# Patient Record
Sex: FEMALE | Race: WHITE
[De-identification: names, ages, dates, MRNs, and addresses within clinical notes are randomized per-mention and may not be internally consistent; named-entity substitution may affect disease eponyms.]

---
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
## 2022-05-23 ENCOUNTER — HOSPITAL ENCOUNTER (EMERGENCY)
Dept: HOSPITAL 46 - ED | Age: 22
Discharge: HOME | End: 2022-05-23
Payer: COMMERCIAL

## 2022-05-23 VITALS — HEIGHT: 65 IN | WEIGHT: 192.9 LBS | BODY MASS INDEX: 32.14 KG/M2

## 2022-05-23 DIAGNOSIS — S06.0X0A: Primary | ICD-10-CM
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
## 2022-05-23 DIAGNOSIS — Z88.8: ICD-10-CM

## 2023-07-05 ENCOUNTER — HOSPITAL ENCOUNTER (EMERGENCY)
Dept: HOSPITAL 46 - ED | Age: 23
Discharge: HOME | End: 2023-07-05
Payer: COMMERCIAL

## 2023-07-05 VITALS — SYSTOLIC BLOOD PRESSURE: 113 MMHG | DIASTOLIC BLOOD PRESSURE: 67 MMHG

## 2023-07-05 VITALS — WEIGHT: 177 LBS | BODY MASS INDEX: 29.49 KG/M2 | HEIGHT: 65 IN

## 2023-07-05 DIAGNOSIS — J02.9: Primary | ICD-10-CM

## 2023-07-05 DIAGNOSIS — Z79.899: ICD-10-CM

## 2023-07-05 DIAGNOSIS — Z91.014: ICD-10-CM

## 2023-07-05 PROCEDURE — A9270 NON-COVERED ITEM OR SERVICE: HCPCS
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
## 2024-08-16 ENCOUNTER — HOSPITAL ENCOUNTER (EMERGENCY)
Dept: HOSPITAL 46 - ED | Age: 24
Discharge: HOME | End: 2024-08-16
Payer: COMMERCIAL

## 2024-08-16 VITALS — BODY MASS INDEX: 31.55 KG/M2 | HEIGHT: 65 IN | WEIGHT: 189.38 LBS

## 2024-08-16 VITALS — SYSTOLIC BLOOD PRESSURE: 118 MMHG | DIASTOLIC BLOOD PRESSURE: 75 MMHG

## 2024-08-16 DIAGNOSIS — K64.8: ICD-10-CM

## 2024-08-16 DIAGNOSIS — Z3A.19: ICD-10-CM

## 2024-08-16 DIAGNOSIS — Z91.014: ICD-10-CM

## 2024-08-16 DIAGNOSIS — O99.612: Primary | ICD-10-CM

## 2024-08-16 DIAGNOSIS — Z79.899: ICD-10-CM

## 2025-01-06 ENCOUNTER — HOSPITAL ENCOUNTER (INPATIENT)
Dept: HOSPITAL 46 - FBC | Age: 25
LOS: 1 days | Discharge: HOME | End: 2025-01-07
Attending: OBSTETRICS & GYNECOLOGY | Admitting: OBSTETRICS & GYNECOLOGY
Payer: COMMERCIAL

## 2025-01-06 VITALS — WEIGHT: 223.99 LBS | HEIGHT: 64.02 IN | BODY MASS INDEX: 38.24 KG/M2

## 2025-01-06 VITALS — DIASTOLIC BLOOD PRESSURE: 94 MMHG | SYSTOLIC BLOOD PRESSURE: 133 MMHG

## 2025-01-06 DIAGNOSIS — O48.0: Primary | ICD-10-CM

## 2025-01-06 DIAGNOSIS — F17.290: ICD-10-CM

## 2025-01-06 DIAGNOSIS — Z3A.40: ICD-10-CM

## 2025-01-06 DIAGNOSIS — F12.90: ICD-10-CM

## 2025-01-06 DIAGNOSIS — J45.909: ICD-10-CM

## 2025-01-06 LAB
ABO GROUP BLD: (no result)
AMPHETAMINES UR QL SCN>500 NG/ML: NEGATIVE
BARBITURATES UR QL SCN>300 NG/ML: NEGATIVE
BENZODIAZ UR QL SCN>300 NG/ML: NEGATIVE
BLD GP AB INVEST PLASRBC-IMP: (no result)
COCAINE UR QL SCN: NEGATIVE
DEPRECATED RDW RBC AUTO: 13.4 FL (ref 10.5–15)
FENTANYL UR QL SCN: NEGATIVE
HCT VFR BLD AUTO: 37 % (ref 35–50)
HGB BLD-MCNC: 12.4 G/DL (ref 12–18)
IAT POLY-SP REAG SERPL QL: POSITIVE
MCH RBC QN AUTO: 27.5 PG (ref 27–36)
MCHC RBC AUTO-ENTMCNC: 33.4 G/DL (ref 30–36)
MCV RBC AUTO: 82.3 FL (ref 81–99)
MDMA UR QL SCN: NEGATIVE
METHADONE UR QL SCN>300 NG/ML: NEGATIVE
OPIATES UR QL SCN: NEGATIVE
OXYCODONE UR QL SCN: NEGATIVE
PCP UR QL SCN>25 NG/ML: NEGATIVE
PLATELET # BLD AUTO: 263 K/UL (ref 140–440)
RBC # BLD AUTO: 4.49 M/UL (ref 4.3–5.7)
RH BLD: NEGATIVE
THC UR QL SCN>50 NG/ML: POSITIVE
TRANSF BAND NUM PATIENT: (no result)

## 2025-01-06 PROCEDURE — 3E0P7VZ INTRODUCTION OF HORMONE INTO FEMALE REPRODUCTIVE, VIA NATURAL OR ARTIFICIAL OPENING: ICD-10-PCS | Performed by: OBSTETRICS & GYNECOLOGY

## 2025-01-06 PROCEDURE — 4A1HXCZ MONITORING OF PRODUCTS OF CONCEPTION, CARDIAC RATE, EXTERNAL APPROACH: ICD-10-PCS | Performed by: OBSTETRICS & GYNECOLOGY

## 2025-01-06 PROCEDURE — A9270 NON-COVERED ITEM OR SERVICE: HCPCS

## 2025-01-06 PROCEDURE — 10907ZC DRAINAGE OF AMNIOTIC FLUID, THERAPEUTIC FROM PRODUCTS OF CONCEPTION, VIA NATURAL OR ARTIFICIAL OPENING: ICD-10-PCS | Performed by: OBSTETRICS & GYNECOLOGY

## 2025-01-06 PROCEDURE — 0KQM0ZZ REPAIR PERINEUM MUSCLE, OPEN APPROACH: ICD-10-PCS | Performed by: OBSTETRICS & GYNECOLOGY

## 2025-01-06 NOTE — PR
[facility]
                                    [address], [place]  [address]
_________________________________________________________________________________________
                                                                 Signed   
 
 
===================================
Progress Notes IP
===================================
Datetime Report Generated by CPJAIME: 01/06/2025 15:38
   
PROGRESS NOTES:  [id number]
Impression:  Normal Progression of Labor; Reassuring Fetal Heart Rate
Plan:  Continue Present Management; Anticipate Vaginal Delivery
Informed Consent Obtain:  Vaginal Delivery; Risks, Benefits and Alternatives Discussed
VITAL SIGNS:  [id number]
Vital Signs:  Reviewed; Within Normal Limits
EXAM:  [id number]
Dilatation:  2.5
Effacement:  90
Station:  -2
Contractions:  q 2-4 minutes
MEMBRANES:  [id number]
Membranes Status:  Ruptured
Comments:  Pt seen and evaluated. Doing very well. Contractions not painful with
   epidural. Denies pelvic pressure or urge to push. No questions or concerns. Reviewed
   EFW and recent US andanticipate course of labor and delivery. All questions answered. 
FETUS A:  [id number]
FHR Baseline:  130
Variability:  Moderate 6-25bpm
Accelerations:  15X15
Decelerations:  None
FHR Category:  Category I
Comments on Fetus A:  Reassuring
FETUS B:  [id number]
Signing Physician:  Matthew Oliveros DO
 
 
Copies:                                
~
 
 
 
 
 
 
 
 
 
*Electronically Signed*  01/06/25  3837  MATTHEW OLIVEROS (WONG) DO            
                                                                       
_________________________________________________________________________________________
PATIENT NAME:     MOSES BHAKTA MICHELLE             
MEDICAL RECORD #: [id number]                     PROGRESS NOTE                 
          ACCT #: [id number]  
DATE OF BIRTH:   [birth year]                                       
PHYSICIAN:   MATTHEW OLIVEROS) DO                   RPT #: [id number]
REPORT IS CONFIDENTIAL AND NOT TO BE RELEASED WITHOUT AUTHORIZATION

## 2025-01-06 NOTE — PR
[facility]
                                    [address], [place]  [address]
_________________________________________________________________________________________
                                                                 Signed   
 
 
===================================
Progress Notes IP
===================================
Datetime Report Generated by CPN: 01/06/2025 19:41
   
PROGRESS NOTES:  [id number]
Impression:  Normal Progression of Labor; Reassuring Fetal Heart Rate
Procedures:  Sterile Vag Exam
Plan:  Continue Present Management; Anticipate Vaginal Delivery
Informed Consent Obtain:  Vaginal Delivery
VITAL SIGNS:  [id number]
Vital Signs:  Reviewed; Within Normal Limits
EXAM:  [id number]
Dilatation:  10.0
Effacement:  100
Station:  -1
Contractions:  q 2-3 min
MEMBRANES:  [id number]
Membranes Status:  Ruptured
ROM Note:  Bulging bag via Dr. Oliveros AROM.  
Comments:  Pt seen and examined. Doing well. Pushing well w/ contractions. ROP position
   noted but making good progress. Will continue to monitor. 
FETUS A:  [id number]
FHR Baseline:  130
Variability:  Moderate 6-25bpm
Accelerations:  15X15
Decelerations:  Variable
FHR Category:  Category II
Comments on Fetus A:  Reassuring
FETUS B:  [id number]
Signing Physician:  Matthew Oliveros DO
 
 
Copies:                                
~
 
 
 
 
 
 
 
 
*Electronically Signed*  01/06/25 1941  MATTHEW OLIVEROS (WONG) DO            
                                                                       
_________________________________________________________________________________________
PATIENT NAME:     MOSES BHAKTA MICHELLE             
MEDICAL RECORD #: [id number]                     PROGRESS NOTE                 
          ACCT #: [id number]  
DATE OF BIRTH:   [birth year]                                       
PHYSICIAN:   MATTHEW OLIVEROS) DO                   RPT #: [id number]
REPORT IS CONFIDENTIAL AND NOT TO BE RELEASED WITHOUT AUTHORIZATION

## 2025-01-07 LAB
ABO GROUP BLD: (no result)
BLD GP AB INVEST PLASRBC-IMP: (no result)
DEPRECATED RDW RBC AUTO: 13.5 FL (ref 10.5–15)
HCT VFR BLD AUTO: 31.6 % (ref 35–50)
HGB BLD-MCNC: 10.7 G/DL (ref 12–18)
IAT POLY-SP REAG SERPL QL: POSITIVE
MCH RBC QN AUTO: 27.9 PG (ref 27–36)
MCHC RBC AUTO-ENTMCNC: 33.9 G/DL (ref 30–36)
MCV RBC AUTO: 82.1 FL (ref 81–99)
PLATELET # BLD AUTO: 254 K/UL (ref 140–440)
RBC # BLD AUTO: 3.84 M/UL (ref 4.3–5.7)
RH BLD: NEGATIVE
RH IG SCN BLD-IMP: NEGATIVE
RH IG VIALS RECOM PATIENT: 1
RHOGAM CANDIDATE: (no result)

## 2025-01-07 NOTE — PR
[facility]
                                    [address]
                                  [place], [place]  [address]
_________________________________________________________________________________________
                                                                 Signed   
 
 
===================================
PP Progress Notes
===================================
Datetime Report Generated by CPN: 2025 18:10
   
SUBJECTIVE:  [id number]
Pain:  Within Normal Limits
Nausea/Vomiting:  Denies
Flatus:  Yes
Bowel Movement:  No
Vital Signs:  [id number]
Vital Signs:  Reviewed; Within Normal Limits
POSTPARTUM EXAM:  Met
Cardiovascular:  Normal
Respiratory:  Normal
Abdomen/Uterus:  Normal
Lochia:  Normal
Vulva/Perineum:  Not Done
Breasts:  Not Done
CVA Tenderness:  Normal
Extremities:  Normal
 Incision:  Not Applicable
Breastfeeding Progress:  Normal
Exam Comments:  Fundus firm U-2 nontender
IMPRESSION/PLAN/PROCEDURES:  [id number]
Impression:  Normal Postpartum Progression
Plan:  Discharge
Progress Notes:  Pt seen and examined. Doing well. Ambulating, voiding, and tolerating
   full diet. Pain and lochia minimal. Breastfeeding well. No fever/chill or other
   concerns. Desires d/c home. Reviewed d/c instructions / medications. All questions
   answered. Undecided on pp contraception but will consider after reviewing options
Signing Physician:  Matthew Oliveros DO
 
 
Copies:                                
~
 
 
 
 
 
 
 
*Electronically Signed*  25  2193  MATTHEW OLIVEROS (WONG) DO            
                                                                       
_________________________________________________________________________________________
PATIENT NAME:     MOSES BHAKTA MICHELLE             
MEDICAL RECORD #: [id number]                     PROGRESS NOTE                 
          ACCT #: [id number]  
DATE OF BIRTH:   [birth year]                                       
PHYSICIAN:   MATTHEW OLIVEROS (JD) DO                   RPT #: [id number]
REPORT IS CONFIDENTIAL AND NOT TO BE RELEASED WITHOUT AUTHORIZATION

## 2025-07-15 ENCOUNTER — HOSPITAL ENCOUNTER (EMERGENCY)
Dept: HOSPITAL 46 - ED | Age: 25
Discharge: HOME | End: 2025-07-15
Payer: COMMERCIAL

## 2025-07-15 VITALS — DIASTOLIC BLOOD PRESSURE: 56 MMHG | SYSTOLIC BLOOD PRESSURE: 113 MMHG

## 2025-07-15 VITALS — WEIGHT: 211.2 LBS | BODY MASS INDEX: 41.46 KG/M2 | HEIGHT: 60 IN

## 2025-07-15 DIAGNOSIS — F17.200: ICD-10-CM

## 2025-07-15 DIAGNOSIS — O03.9: Primary | ICD-10-CM

## 2025-07-15 LAB
ABO GROUP BLD: (no result)
ALBUMIN SERPL-MCNC: 3.3 G/DL (ref 3.4–5)
ALBUMIN/GLOB SERPL: 0.92 {RATIO} (ref 1.1–2.4)
ALP SERPL-CCNC: 84 U/L (ref 46–116)
ALT SERPL W P-5'-P-CCNC: 33 U/L (ref 14–59)
ANION GAP SERPL CALCULATED.4IONS-SCNC: 14.7 MMOL/L (ref 7–21)
AST SERPL-CCNC: 17 U/L (ref 15–37)
BACTERIA #/AREA URNS HPF: (no result) /HPF
BACTERIA UR CULT: YES
BASOPHILS NFR BLD AUTO: (no result) % (ref 0.1–1.2)
BASOPHILS NFR BLD AUTO: 0.4 % (ref 0.1–1.2)
BASOPHILS NFR BLD MANUAL: 1 %
BILIRUB SERPL-MCNC: 0.3 MG/DL (ref 0.2–1)
BILIRUB UR QL STRIP: NEGATIVE
BUN SERPL-MCNC: 6 MG/DL (ref 7–18)
BUN/CREAT SERPL: 8.95 (ref 6–28.6)
CALCIUM SERPL-MCNC: 8.6 MG/DL (ref 8.5–10.1)
CASTS #/AREA URNS LPF: (no result) "LPF"
CHLORIDE SERPL-SCNC: 103 MMOL/L (ref 98–107)
CO2 SERPL-SCNC: 23 MMOL/L (ref 21–32)
CREAT SERPL-MCNC: 0.67 MG/DL (ref 0.55–1.02)
CRYSTALS URNS MICRO: (no result)
EGFRCR SERPLBLD CKD-EPI 2021: 127 ML/MIN (ref 60–?)
EOSINOPHIL NFR BLD AUTO: (no result) % (ref 0.7–5.8)
EOSINOPHIL NFR BLD AUTO: 2.2 % (ref 0.7–5.8)
EOSINOPHIL NFR BLD MANUAL: (no result) %
EPI CELLS #/AREA URNS HPF: (no result) /LPF
GLOBULIN SER-MCNC: 3.6 G/DL (ref 1.8–3.5)
HCT VFR BLD AUTO: 30.2 % (ref 34.1–44.9)
HCT VFR BLD AUTO: 34 % (ref 34.1–44.9)
HGB BLD-MCNC: 11 G/DL (ref 11.2–15.7)
HGB BLD-MCNC: 9.9 G/DL (ref 11.2–15.7)
HGB UR QL STRIP: (no result)
KETONES UR QL STRIP: (no result)
LEUKOCYTE ESTERASE UR QL STRIP: (no result)
LYMPHOCYTES NFR BLD AUTO: (no result) % (ref 19.3–51.7)
LYMPHOCYTES NFR BLD AUTO: 12.3 % (ref 19.3–51.7)
LYMPHOCYTES NFR BLD MANUAL: 25 %
MCH RBC QN AUTO: 25.6 PG (ref 25.6–32.2)
MCH RBC QN AUTO: 26 PG (ref 25.6–32.2)
MCHC RBC AUTO-ENTMCNC: 32.4 G/DL (ref 32.2–35.5)
MCHC RBC AUTO-ENTMCNC: 32.8 G/DL (ref 32.2–35.5)
MCV RBC AUTO: 79.3 FL (ref 79.4–94.8)
MCV RBC AUTO: 79.3 FL (ref 79.4–94.8)
MONOCYTES NFR BLD AUTO: (no result) % (ref 4.7–12.5)
MONOCYTES NFR BLD AUTO: 4.9 % (ref 4.7–12.5)
MONOCYTES NFR BLD MANUAL: 1 %
NEUTROPHILS NFR BLD AUTO: (no result) % (ref 34–71.1)
NEUTROPHILS NFR BLD AUTO: 79.9 % (ref 34–71.1)
NEUTS BAND NFR BLD MANUAL: (no result) %
NEUTS SEG NFR BLD MANUAL: 73 %
NITRITE UR QL STRIP: POSITIVE
OTHER CELLS NFR BLD MANUAL: (no result) %
PH UR STRIP: 5 [PH] (ref 5–7)
PLATELET # BLD AUTO: 315 K/UL (ref 182–369)
PLATELET # BLD AUTO: 336 K/UL (ref 182–369)
POTASSIUM SERPL-SCNC: 3.7 MMOL/L (ref 3.5–5.1)
PROT SERPL-MCNC: 6.9 G/DL (ref 6.4–8.2)
RBC # BLD AUTO: 3.81 M/UL (ref 3.93–5.22)
RBC # BLD AUTO: 4.29 M/UL (ref 3.93–5.22)
RBC #/AREA URNS HPF: >50 /HPF (ref 0–5)
RH BLD: NEGATIVE
URN SPEC COLLECT METH UR: (no result)
WBC OTHER NFR BLD: (no result) %
WBC OTHER NFR BLD: (no result) %

## 2025-07-15 PROCEDURE — A9270 NON-COVERED ITEM OR SERVICE: HCPCS
